# Patient Record
Sex: FEMALE
[De-identification: names, ages, dates, MRNs, and addresses within clinical notes are randomized per-mention and may not be internally consistent; named-entity substitution may affect disease eponyms.]

---

## 2017-01-09 ENCOUNTER — APPOINTMENT (OUTPATIENT)
Dept: NEUROLOGY | Facility: CLINIC | Age: 32
End: 2017-01-09

## 2017-01-09 VITALS
HEIGHT: 59 IN | WEIGHT: 85 LBS | OXYGEN SATURATION: 99 % | HEART RATE: 68 BPM | SYSTOLIC BLOOD PRESSURE: 116 MMHG | DIASTOLIC BLOOD PRESSURE: 79 MMHG | BODY MASS INDEX: 17.14 KG/M2

## 2017-01-23 ENCOUNTER — APPOINTMENT (OUTPATIENT)
Dept: NEUROLOGY | Facility: CLINIC | Age: 32
End: 2017-01-23

## 2017-01-23 VITALS
OXYGEN SATURATION: 98 % | HEART RATE: 83 BPM | SYSTOLIC BLOOD PRESSURE: 101 MMHG | WEIGHT: 85 LBS | HEIGHT: 59 IN | DIASTOLIC BLOOD PRESSURE: 69 MMHG | BODY MASS INDEX: 17.14 KG/M2

## 2017-01-23 VITALS — TEMPERATURE: 97.2 F

## 2017-01-23 DIAGNOSIS — R20.0 ANESTHESIA OF SKIN: ICD-10-CM

## 2017-02-13 ENCOUNTER — APPOINTMENT (OUTPATIENT)
Dept: NEUROLOGY | Facility: CLINIC | Age: 32
End: 2017-02-13

## 2017-02-13 ENCOUNTER — LABORATORY RESULT (OUTPATIENT)
Age: 32
End: 2017-02-13

## 2017-02-13 VITALS
SYSTOLIC BLOOD PRESSURE: 103 MMHG | HEART RATE: 95 BPM | OXYGEN SATURATION: 96 % | DIASTOLIC BLOOD PRESSURE: 73 MMHG | WEIGHT: 85 LBS | BODY MASS INDEX: 17.14 KG/M2 | HEIGHT: 59 IN

## 2017-02-13 DIAGNOSIS — G62.9 POLYNEUROPATHY, UNSPECIFIED: ICD-10-CM

## 2017-02-15 ENCOUNTER — RESULT REVIEW (OUTPATIENT)
Age: 32
End: 2017-02-15

## 2017-02-15 LAB
ALBUMIN MFR SERPL ELPH: 53.3 %
ALBUMIN SERPL-MCNC: 3.9 G/DL
ALBUMIN/GLOB SERPL: 1.1 RATIO
ALPHA1 GLOB MFR SERPL ELPH: 5.5 %
ALPHA1 GLOB SERPL ELPH-MCNC: 0.4 G/DL
ALPHA2 GLOB MFR SERPL ELPH: 13.6 %
ALPHA2 GLOB SERPL ELPH-MCNC: 1 G/DL
ANA SER IF-ACNC: NEGATIVE
B BURGDOR IGG+IGM SER QL IB: NORMAL
B-GLOBULIN MFR SERPL ELPH: 13.7 %
B-GLOBULIN SERPL ELPH-MCNC: 1 G/DL
CRP SERPL-MCNC: 0.9 MG/DL
DEPRECATED KAPPA LC FREE/LAMBDA SER: 1.54 RATIO
ERYTHROCYTE [SEDIMENTATION RATE] IN BLOOD BY WESTERGREN METHOD: 19 MM/HR
FOLATE SERPL-MCNC: >20 NG/ML
GAMMA GLOB FLD ELPH-MCNC: 1 G/DL
GAMMA GLOB MFR SERPL ELPH: 13.9 %
HBA1C MFR BLD HPLC: 6.2 %
HIV1+2 AB SPEC QL IA.RAPID: NONREACTIVE
IGA 24H UR QL IFE: NORMAL
IGA SER QL IEP: 180 MG/DL
IGG SER QL IEP: 1090 MG/DL
IGM SER QL IEP: 38 MG/DL
INTERPRETATION SERPL IEP-IMP: NORMAL
KAPPA LC CSF-MCNC: 1.3 MG/DL
KAPPA LC SERPL-MCNC: 2 MG/DL
M PROTEIN SPEC IFE-MCNC: NORMAL
PROT SERPL-MCNC: 7.3 G/DL
PROT SERPL-MCNC: 7.3 G/DL
RPR SER-TITR: NORMAL
TSH SERPL-ACNC: 1.99 UIU/ML
VIT B12 SERPL-MCNC: 574 PG/ML

## 2017-05-17 ENCOUNTER — APPOINTMENT (OUTPATIENT)
Dept: NEUROLOGY | Facility: CLINIC | Age: 32
End: 2017-05-17

## 2017-05-17 VITALS
OXYGEN SATURATION: 96 % | SYSTOLIC BLOOD PRESSURE: 109 MMHG | DIASTOLIC BLOOD PRESSURE: 74 MMHG | BODY MASS INDEX: 17.14 KG/M2 | HEIGHT: 59 IN | WEIGHT: 85 LBS | HEART RATE: 77 BPM

## 2017-05-17 DIAGNOSIS — I21.3 ST ELEVATION (STEMI) MYOCARDIAL INFARCTION OF UNSPECIFIED SITE: ICD-10-CM

## 2017-05-17 RX ORDER — OLANZAPINE 10 MG/1
10 TABLET, ORALLY DISINTEGRATING ORAL
Qty: 30 | Refills: 0 | Status: DISCONTINUED | COMMUNITY
Start: 2016-10-14 | End: 2017-05-17

## 2017-05-17 RX ORDER — GABAPENTIN 600 MG/1
600 TABLET, COATED ORAL
Qty: 60 | Refills: 0 | Status: DISCONTINUED | COMMUNITY
Start: 2016-06-07 | End: 2017-05-17

## 2017-05-17 RX ORDER — OLANZAPINE 20 MG/1
20 TABLET, ORALLY DISINTEGRATING ORAL
Qty: 30 | Refills: 0 | Status: DISCONTINUED | COMMUNITY
Start: 2016-11-11 | End: 2017-05-17

## 2017-05-17 RX ORDER — DIVALPROEX SODIUM 250 MG/1
250 TABLET, EXTENDED RELEASE ORAL
Qty: 60 | Refills: 0 | Status: DISCONTINUED | COMMUNITY
Start: 2016-09-21 | End: 2017-05-17

## 2017-05-17 RX ORDER — OLANZAPINE 5 MG/1
5 TABLET, ORALLY DISINTEGRATING ORAL
Qty: 30 | Refills: 0 | Status: DISCONTINUED | COMMUNITY
Start: 2016-09-21 | End: 2017-05-17

## 2017-05-17 RX ORDER — ASPIRIN 81 MG/1
81 TABLET ORAL
Qty: 30 | Refills: 0 | Status: DISCONTINUED | COMMUNITY
Start: 2016-11-23 | End: 2017-05-17

## 2017-05-17 RX ORDER — METOPROLOL TARTRATE 25 MG/1
25 TABLET, FILM COATED ORAL
Qty: 30 | Refills: 0 | Status: DISCONTINUED | COMMUNITY
Start: 2016-11-23 | End: 2017-05-17

## 2017-05-17 RX ORDER — ARMODAFINIL 150 MG/1
150 TABLET ORAL
Qty: 30 | Refills: 0 | Status: DISCONTINUED | COMMUNITY
Start: 2016-04-12 | End: 2017-05-17

## 2017-05-17 RX ORDER — SUMATRIPTAN SUCCINATE 6 MG/.5ML
6 INJECTION, SOLUTION SUBCUTANEOUS
Qty: 2 | Refills: 0 | Status: DISCONTINUED | COMMUNITY
Start: 2016-06-20 | End: 2017-05-17

## 2017-08-14 ENCOUNTER — APPOINTMENT (OUTPATIENT)
Dept: NEUROLOGY | Facility: CLINIC | Age: 32
End: 2017-08-14
Payer: COMMERCIAL

## 2017-08-14 VITALS
WEIGHT: 84 LBS | BODY MASS INDEX: 16.93 KG/M2 | SYSTOLIC BLOOD PRESSURE: 122 MMHG | DIASTOLIC BLOOD PRESSURE: 86 MMHG | HEIGHT: 59 IN | HEART RATE: 109 BPM | OXYGEN SATURATION: 95 %

## 2017-08-14 PROCEDURE — 64615 CHEMODENERV MUSC MIGRAINE: CPT

## 2017-08-14 RX ORDER — ROPINIROLE 0.25 MG/1
0.25 TABLET, FILM COATED ORAL
Qty: 60 | Refills: 0 | Status: DISCONTINUED | COMMUNITY
Start: 2016-07-26 | End: 2017-08-14

## 2017-08-14 RX ORDER — HYDROCODONE BITARTRATE AND ACETAMINOPHEN 5; 325 MG/1; MG/1
5-325 TABLET ORAL
Qty: 20 | Refills: 0 | Status: DISCONTINUED | COMMUNITY
Start: 2016-12-09 | End: 2017-08-14

## 2017-11-10 ENCOUNTER — APPOINTMENT (OUTPATIENT)
Dept: NEUROLOGY | Facility: CLINIC | Age: 32
End: 2017-11-10
Payer: COMMERCIAL

## 2017-11-10 VITALS
HEIGHT: 59 IN | BODY MASS INDEX: 18.35 KG/M2 | DIASTOLIC BLOOD PRESSURE: 79 MMHG | WEIGHT: 91 LBS | OXYGEN SATURATION: 99 % | TEMPERATURE: 98.1 F | SYSTOLIC BLOOD PRESSURE: 122 MMHG | HEART RATE: 75 BPM

## 2017-11-10 PROCEDURE — 64615 CHEMODENERV MUSC MIGRAINE: CPT

## 2018-02-12 ENCOUNTER — APPOINTMENT (OUTPATIENT)
Dept: NEUROLOGY | Facility: CLINIC | Age: 33
End: 2018-02-12
Payer: COMMERCIAL

## 2018-02-12 VITALS
HEIGHT: 59 IN | OXYGEN SATURATION: 98 % | SYSTOLIC BLOOD PRESSURE: 111 MMHG | HEART RATE: 89 BPM | DIASTOLIC BLOOD PRESSURE: 83 MMHG | WEIGHT: 94 LBS | TEMPERATURE: 97.3 F | BODY MASS INDEX: 18.95 KG/M2

## 2018-02-12 PROCEDURE — 64615 CHEMODENERV MUSC MIGRAINE: CPT

## 2018-05-24 ENCOUNTER — APPOINTMENT (OUTPATIENT)
Dept: NEUROLOGY | Facility: CLINIC | Age: 33
End: 2018-05-24
Payer: COMMERCIAL

## 2018-05-24 VITALS
HEIGHT: 57 IN | BODY MASS INDEX: 20.28 KG/M2 | HEART RATE: 68 BPM | OXYGEN SATURATION: 100 % | WEIGHT: 94 LBS | SYSTOLIC BLOOD PRESSURE: 117 MMHG | DIASTOLIC BLOOD PRESSURE: 81 MMHG

## 2018-05-24 PROCEDURE — 64615 CHEMODENERV MUSC MIGRAINE: CPT

## 2018-10-12 ENCOUNTER — APPOINTMENT (OUTPATIENT)
Dept: NEUROLOGY | Facility: CLINIC | Age: 33
End: 2018-10-12
Payer: COMMERCIAL

## 2018-10-12 VITALS
HEIGHT: 57 IN | OXYGEN SATURATION: 96 % | TEMPERATURE: 98.7 F | HEART RATE: 89 BPM | WEIGHT: 93 LBS | BODY MASS INDEX: 20.06 KG/M2

## 2018-10-12 VITALS — DIASTOLIC BLOOD PRESSURE: 71 MMHG | SYSTOLIC BLOOD PRESSURE: 104 MMHG

## 2018-10-12 PROCEDURE — 64615 CHEMODENERV MUSC MIGRAINE: CPT

## 2019-01-15 ENCOUNTER — APPOINTMENT (OUTPATIENT)
Dept: NEUROLOGY | Facility: CLINIC | Age: 34
End: 2019-01-15
Payer: COMMERCIAL

## 2019-01-15 VITALS
OXYGEN SATURATION: 98 % | HEART RATE: 70 BPM | WEIGHT: 97.04 LBS | HEIGHT: 57 IN | DIASTOLIC BLOOD PRESSURE: 75 MMHG | TEMPERATURE: 97.6 F | BODY MASS INDEX: 20.94 KG/M2 | SYSTOLIC BLOOD PRESSURE: 115 MMHG

## 2019-01-15 PROCEDURE — 64615 CHEMODENERV MUSC MIGRAINE: CPT

## 2019-02-01 ENCOUNTER — APPOINTMENT (OUTPATIENT)
Dept: NEUROLOGY | Facility: CLINIC | Age: 34
End: 2019-02-01
Payer: COMMERCIAL

## 2019-02-01 VITALS
BODY MASS INDEX: 21.36 KG/M2 | HEIGHT: 57 IN | OXYGEN SATURATION: 93 % | WEIGHT: 99 LBS | DIASTOLIC BLOOD PRESSURE: 75 MMHG | HEART RATE: 91 BPM | SYSTOLIC BLOOD PRESSURE: 108 MMHG | TEMPERATURE: 98 F

## 2019-02-01 DIAGNOSIS — R20.9 UNSPECIFIED DISTURBANCES OF SKIN SENSATION: ICD-10-CM

## 2019-02-01 PROCEDURE — 99214 OFFICE O/P EST MOD 30 MIN: CPT

## 2019-02-06 PROBLEM — R20.9 SENSORY DISTURBANCE: Status: ACTIVE | Noted: 2019-02-06

## 2019-02-06 NOTE — DISCUSSION/SUMMARY
[FreeTextEntry1] : Symptoms can be found in MS- will pursue MRI with and without including the orbits for further evaluation. \par \par Dr. Dobbs- Neuro-ophthalmology\par \par MRI brain with and with without

## 2019-02-06 NOTE — PHYSICAL EXAM
[FreeTextEntry1] : Constitutional: alert, in no acute distress and well nourished. \par Psychiatric: oriented to person, place, and time, insight and judgment were intact and the affect was normal. \par Neurologic: \par Memory: short term memory intact, remote memory intact and recent registration memory intact. \par Language: fluency intact. \par Cranial Nerves: visual acuity intact bilaterally, visual fields full to confrontation, pupils equal round and reactive to light, extraocular motion intact, facial sensation intact symmetrically, face symmetrical, hearing was intact bilaterally, tongue and palate midline, head turning and shoulder shrug symmetric and there was no tongue deviation with protrusion. \par Motor: muscle tone was normal in all four extremities and muscle strength was normal in all four extremities. \par Motor Strength:. no pronator drift on the right. no pronator drift on the left. strength was normal in both upper extremities. strength was normal in both lower extremities. \par Sensory exam: light touch was intact, pain and temperature was intact and vibration was intact . Romberg's sign was negative. DECREASED DISTAL VIBRATION- 14 SEC BILATERALLY \par Coordination:. normal gait. balance was intact.  there was no past-pointing. no tremor present. Dysdiadochokinesia was not present. Finger to nose dysmetria was not present. Heel-shin dysmetria was not present. \par Deep tendon reflexes: 1-2 throughout\par Plantar responses normal on the right, normal on the left. \par Ankle Clonus absent on the right, absent on the left.  \par Eyes: the sclera and conjunctiva were normal, pupils were equal in size, round, reactive to light, with normal accommodation, extraocular movements were intact and full visual field. \par Musculoskeletal: normal gait and muscle strength and tone were normal. \par Skin: normal skin color and pigmentation, normal skin turgor and no skin lesions. \par

## 2019-02-06 NOTE — HISTORY OF PRESENT ILLNESS
[FreeTextEntry1] : Patient presents for  neurological follow up- she has a history of left arm and leg intermittent numbness. EMG 1/2017 - unremarkable, minor abnormalities may indicate distal polyneuropathy / radiculopathy.\par \par She has h/o left eye intermittent blurred vision which increased in severity yesterday. Symptoms have since resolved. The first episode occurred a few months ago. She reports having an unremarkable dilated eye exam at the time.\par \par She also reports issues of urinary incontinence. \par \par

## 2019-02-13 ENCOUNTER — APPOINTMENT (OUTPATIENT)
Dept: OPHTHALMOLOGY | Facility: CLINIC | Age: 34
End: 2019-02-13
Payer: COMMERCIAL

## 2019-02-13 DIAGNOSIS — H43.392 OTHER VITREOUS OPACITIES, LEFT EYE: ICD-10-CM

## 2019-02-13 DIAGNOSIS — H53.8 OTHER VISUAL DISTURBANCES: ICD-10-CM

## 2019-02-13 PROCEDURE — 92134 CPTRZ OPH DX IMG PST SGM RTA: CPT

## 2019-02-13 PROCEDURE — 92083 EXTENDED VISUAL FIELD XM: CPT

## 2019-02-13 PROCEDURE — 99244 OFF/OP CNSLTJ NEW/EST MOD 40: CPT

## 2019-02-22 ENCOUNTER — APPOINTMENT (OUTPATIENT)
Dept: MRI IMAGING | Facility: CLINIC | Age: 34
End: 2019-02-22
Payer: COMMERCIAL

## 2019-02-22 ENCOUNTER — OUTPATIENT (OUTPATIENT)
Dept: OUTPATIENT SERVICES | Facility: HOSPITAL | Age: 34
LOS: 1 days | End: 2019-02-22

## 2019-02-22 PROCEDURE — 70553 MRI BRAIN STEM W/O & W/DYE: CPT | Mod: 26

## 2019-02-22 PROCEDURE — 72156 MRI NECK SPINE W/O & W/DYE: CPT | Mod: 26

## 2019-02-24 NOTE — PROCEDURE
[FreeTextEntry1] : chemodenervation for headache [FreeTextEntry2] : intractable migraine [FreeTextEntry4] : ethyl chloride [FreeTextEntry3] : Patient was consented with explanation of risks and benefits including black box warnings and explanation of alternative treatments. \par \par Patient was injected in the sitting position with ethyl chloride spray used before each injection. \par Muscles injected:\par Procerus - 5 units\par  - 5 units in each side = 10 units\par Frontalis - 5 units in 2 injections on each side = 20 units\par Temporalis - 5 units in 4 injection in each side = 40 units\par Occipitalis - 5 units in 3 injections on each side = 30 units\par Splenius capitis - 5 units in 2 injections on each side = 20\par Trapezius - 5 units in 3 injections on each side = 30\par \par Total used 155 units Onabotulinum toxin\par Total wasted = 45\par Total billable = 200 units\par \par Patient tolerated procedure well with less pain. I have asked the patient to continue keeping track of headaches.\par

## 2019-04-16 ENCOUNTER — APPOINTMENT (OUTPATIENT)
Dept: NEUROLOGY | Facility: CLINIC | Age: 34
End: 2019-04-16
Payer: COMMERCIAL

## 2019-04-16 VITALS
OXYGEN SATURATION: 94 % | DIASTOLIC BLOOD PRESSURE: 76 MMHG | SYSTOLIC BLOOD PRESSURE: 110 MMHG | TEMPERATURE: 94 F | BODY MASS INDEX: 20.93 KG/M2 | HEART RATE: 80 BPM | HEIGHT: 57 IN | WEIGHT: 97 LBS

## 2019-04-16 PROCEDURE — 64615 CHEMODENERV MUSC MIGRAINE: CPT

## 2019-07-31 ENCOUNTER — APPOINTMENT (OUTPATIENT)
Dept: NEUROLOGY | Facility: CLINIC | Age: 34
End: 2019-07-31
Payer: COMMERCIAL

## 2019-07-31 ENCOUNTER — NON-APPOINTMENT (OUTPATIENT)
Age: 34
End: 2019-07-31

## 2019-07-31 VITALS
SYSTOLIC BLOOD PRESSURE: 103 MMHG | HEART RATE: 76 BPM | DIASTOLIC BLOOD PRESSURE: 70 MMHG | HEIGHT: 57 IN | OXYGEN SATURATION: 97 % | BODY MASS INDEX: 20.49 KG/M2 | TEMPERATURE: 98.1 F | WEIGHT: 95 LBS

## 2019-07-31 PROCEDURE — 64615 CHEMODENERV MUSC MIGRAINE: CPT

## 2019-07-31 NOTE — PROCEDURE
[FreeTextEntry3] : Patient was consented with explanation of risks and benefits including black box warnings and explanation of alternative treatments. She has had a very good response to her injection cycle with only 1 bad headache one week ago\par \par Patient was injected in the sitting position with ethyl chloride spray used before each injection. \par Muscles injected:\par Procerus - 5 units\par  - 5 units in each side = 10 units\par Frontalis - 5 units in 2 injections on each side = 20 units\par Temporalis - 5 units in 4 injection in each side = 40 units\par Occipitalis - 5 units in 3 injections on each side = 30 units\par Splenius capitis - 5 units in 2 injections on each side = 20\par Trapezius - 5 units in 3 injections on each side = 30\par \par Total used 155 units Onabotulinum toxin\par Total wasted = 45\par Total billable = 200 units\par \par Patient tolerated procedure well with less pain. I have asked the patient to continue keeping track of headaches.\par

## 2019-11-12 ENCOUNTER — APPOINTMENT (OUTPATIENT)
Dept: NEUROLOGY | Facility: CLINIC | Age: 34
End: 2019-11-12
Payer: COMMERCIAL

## 2019-11-12 VITALS
TEMPERATURE: 98 F | HEART RATE: 87 BPM | WEIGHT: 94 LBS | OXYGEN SATURATION: 100 % | SYSTOLIC BLOOD PRESSURE: 101 MMHG | BODY MASS INDEX: 20.28 KG/M2 | HEIGHT: 57 IN | DIASTOLIC BLOOD PRESSURE: 76 MMHG

## 2019-11-12 PROCEDURE — 64615 CHEMODENERV MUSC MIGRAINE: CPT

## 2019-11-12 NOTE — PROCEDURE
[FreeTextEntry3] : Patient was consented with explanation of risks and benefits including black box warnings and explanation of alternative treatments. She has had a very good response to her injection cycle with only 1 bad headache one week ago\par \par Patient was injected in the sitting position with ethyl chloride spray used before each injection. \par Muscles injected:\par Procerus - 5 units\par  - 5 units in each side = 10 units\par Frontalis - 5 units in 2 injections on each side = 20 units\par Temporalis - 5 units in 4 injection in each side = 40 units\par Occipitalis - 5 units in 3 injections on each side = 30 units\par Splenius capitis - 5 units in 2 injections on each side = 20\par Trapezius - 5 units in 3 injections on each side = 30\par \par Total used 145 units Onabotulinum toxin\par Total wasted = 55\par Total billable = 200 units\par \par Patient tolerated procedure well with less pain. I have asked the patient to continue keeping track of headaches.\par

## 2020-02-18 ENCOUNTER — APPOINTMENT (OUTPATIENT)
Dept: NEUROLOGY | Facility: CLINIC | Age: 35
End: 2020-02-18
Payer: COMMERCIAL

## 2020-02-18 VITALS
HEIGHT: 57 IN | BODY MASS INDEX: 21.57 KG/M2 | SYSTOLIC BLOOD PRESSURE: 121 MMHG | WEIGHT: 100 LBS | OXYGEN SATURATION: 100 % | HEART RATE: 97 BPM | TEMPERATURE: 98.7 F | DIASTOLIC BLOOD PRESSURE: 86 MMHG

## 2020-02-18 DIAGNOSIS — G43.719 CHRONIC MIGRAINE W/OUT AURA, INTRACTABLE, W/OUT STATUS MIGRAINOSUS: ICD-10-CM

## 2020-02-18 PROCEDURE — 64615 CHEMODENERV MUSC MIGRAINE: CPT

## 2020-02-18 RX ORDER — MEMANTINE HYDROCHLORIDE 10 MG/1
10 TABLET ORAL
Qty: 30 | Refills: 5 | Status: ACTIVE | COMMUNITY

## 2020-02-18 RX ORDER — METOPROLOL SUCCINATE 50 MG/1
50 TABLET, EXTENDED RELEASE ORAL DAILY
Refills: 5 | Status: ACTIVE | COMMUNITY

## 2020-02-18 NOTE — PROCEDURE
[FreeTextEntry3] : Patient was consented with explanation of risks and benefits including black box warnings and explanation of alternative treatments. She has had a very good response to her injection cycle with only 1 bad headache one week ago\par \par Patient was injected in the sitting position with ethyl chloride spray used before each injection. \par Muscles injected:\par Procerus - 5 units\par  - 0 units in each side = 0 units\par Frontalis - 5 units in 2 injections on each side = 20 units\par Temporalis - 5 units in 4 injection in each side = 40 units\par Occipitalis - 5 units in 3 injections on each side = 30 units\par Splenius capitis - 5 units in 2 injections on each side = 20\par Trapezius - 5 units in 3 injections on each side = 30\par \par Total used 145 units Onabotulinum toxin\par Total wasted = 55\par Total billable = 200 units\par \par Patient tolerated procedure well with less pain. I have asked the patient to continue keeping track of headaches.\par

## 2020-05-19 ENCOUNTER — APPOINTMENT (OUTPATIENT)
Dept: NEUROLOGY | Facility: CLINIC | Age: 35
End: 2020-05-19

## 2020-08-18 ENCOUNTER — APPOINTMENT (OUTPATIENT)
Dept: NEUROLOGY | Facility: CLINIC | Age: 35
End: 2020-08-18
Payer: COMMERCIAL

## 2020-08-18 VITALS
SYSTOLIC BLOOD PRESSURE: 118 MMHG | DIASTOLIC BLOOD PRESSURE: 87 MMHG | WEIGHT: 89 LBS | HEIGHT: 57 IN | TEMPERATURE: 97.4 F | BODY MASS INDEX: 19.2 KG/M2 | HEART RATE: 81 BPM | OXYGEN SATURATION: 98 %

## 2020-08-18 PROCEDURE — 64615 CHEMODENERV MUSC MIGRAINE: CPT

## 2020-08-18 RX ORDER — SUMATRIPTAN SUCCINATE 6 MG/.5ML
6 INJECTION SUBCUTANEOUS
Qty: 1 | Refills: 5 | Status: ACTIVE | COMMUNITY
Start: 2020-02-18 | End: 1900-01-01

## 2020-08-18 NOTE — PROCEDURE
[FreeTextEntry1] : Botox [FreeTextEntry2] : Migraines [FreeTextEntry4] : Ethylchloride [FreeTextEntry3] : Small daily headache since missing last botox due to Pandemic. Botox works for her as reported by patient. Refilled Imitrex for pt as well. \par \par Patient was injected in the sitting position with ethyl chloride spray used before each injection. \par Muscles injected:\par Procerus - 5 units\par  - 0 units in each side = 0 units\par Frontalis - 5 units in 2 injections on each side = 20 units\par Temporalis - 5 units in 4 injection in each side = 40 units\par Occipitalis - 5 units in 3 injections on each side = 30 units\par Splenius capitis - 5 units in 2 injections on each side = 20\par Trapezius - 5 units in 3 injections on each side = 30\par \par Total used 145 units Onabotulinum toxin\par Total wasted = 55\par Total billable = 200 units\par \par Patient tolerated procedure well with less pain. I have asked the patient to continue keeping track of headaches.

## 2021-10-20 ENCOUNTER — NON-APPOINTMENT (OUTPATIENT)
Age: 36
End: 2021-10-20

## 2021-10-20 ENCOUNTER — APPOINTMENT (OUTPATIENT)
Dept: NEUROLOGY | Facility: CLINIC | Age: 36
End: 2021-10-20
Payer: COMMERCIAL

## 2021-10-20 VITALS
BODY MASS INDEX: 19.2 KG/M2 | WEIGHT: 89 LBS | SYSTOLIC BLOOD PRESSURE: 95 MMHG | HEIGHT: 57 IN | DIASTOLIC BLOOD PRESSURE: 68 MMHG | OXYGEN SATURATION: 97 % | TEMPERATURE: 98 F | HEART RATE: 100 BPM

## 2021-10-20 DIAGNOSIS — G43.719 CHRONIC MIGRAINE W/OUT AURA, INTRACTABLE, W/OUT STATUS MIGRAINOSUS: ICD-10-CM

## 2021-10-20 PROCEDURE — 99213 OFFICE O/P EST LOW 20 MIN: CPT

## 2021-10-20 RX ORDER — MIRTAZAPINE 30 MG/1
30 TABLET, FILM COATED ORAL
Refills: 0 | Status: DISCONTINUED | COMMUNITY
End: 2021-10-20

## 2021-10-20 RX ORDER — BACLOFEN 10 MG/1
10 TABLET ORAL
Qty: 90 | Refills: 1 | Status: DISCONTINUED | COMMUNITY
End: 2021-10-20

## 2021-10-20 RX ORDER — MECLIZINE HYDROCHLORIDE 12.5 MG/1
12.5 TABLET ORAL DAILY
Qty: 10 | Refills: 1 | Status: ACTIVE | COMMUNITY
Start: 2021-10-20 | End: 1900-01-01

## 2021-10-20 RX ORDER — FAMOTIDINE 20 MG/1
20 TABLET, FILM COATED ORAL DAILY
Refills: 0 | Status: ACTIVE | COMMUNITY

## 2021-11-04 NOTE — HISTORY OF PRESENT ILLNESS
[FreeTextEntry1] : Headaches have gotten worse over past year since she has stopped getting Botox injections\par Was previously getting very good benefit from Botox, good relief for ll weeks post \par Getting 13-15 headache days a month on her headache diary, can last 2-3 days.\par Gets severe headaches keeping her in bed all day- works from home which helps, can do work at random hours when her migraine subsides \par Has associated nausea, blurred vision, and vertigo with her migraines. +Photophophobia, no phonophobia.  \par Triggers- weather\par Now takes just Advil for headaches. Needs refill for sumatriptan which provides some relief\par gets blurred vision intermittently in her L eye. Has had this previously associated with her migraines\par \par \par

## 2021-11-04 NOTE — ASSESSMENT
[FreeTextEntry1] : restart meds and restart botox once approved\par meclizine prn for vertigo recurrent

## 2021-11-05 ENCOUNTER — APPOINTMENT (OUTPATIENT)
Dept: NEUROLOGY | Facility: CLINIC | Age: 36
End: 2021-11-05

## 2022-01-06 ENCOUNTER — APPOINTMENT (OUTPATIENT)
Dept: NEUROLOGY | Facility: CLINIC | Age: 37
End: 2022-01-06